# Patient Record
Sex: MALE | Race: WHITE | Employment: FULL TIME | URBAN - METROPOLITAN AREA
[De-identification: names, ages, dates, MRNs, and addresses within clinical notes are randomized per-mention and may not be internally consistent; named-entity substitution may affect disease eponyms.]

---

## 2017-05-29 ENCOUNTER — HOSPITAL ENCOUNTER (EMERGENCY)
Facility: HOSPITAL | Age: 57
Discharge: HOME | End: 2017-05-29
Attending: EMERGENCY MEDICINE
Payer: COMMERCIAL

## 2017-05-29 ENCOUNTER — APPOINTMENT (OUTPATIENT)
Dept: ULTRASOUND IMAGING | Facility: HOSPITAL | Age: 57
End: 2017-05-29
Attending: EMERGENCY MEDICINE
Payer: COMMERCIAL

## 2017-05-29 VITALS
RESPIRATION RATE: 19 BRPM | TEMPERATURE: 97 F | DIASTOLIC BLOOD PRESSURE: 91 MMHG | HEART RATE: 79 BPM | SYSTOLIC BLOOD PRESSURE: 129 MMHG | HEIGHT: 71 IN | BODY MASS INDEX: 22.4 KG/M2 | WEIGHT: 160 LBS | OXYGEN SATURATION: 97 %

## 2017-05-29 DIAGNOSIS — M71.21 POPLITEAL CYST, RIGHT: Primary | ICD-10-CM

## 2017-05-29 PROCEDURE — 99284 EMERGENCY DEPT VISIT MOD MDM: CPT

## 2017-05-29 PROCEDURE — 36415 COLL VENOUS BLD VENIPUNCTURE: CPT

## 2017-05-29 PROCEDURE — 93971 EXTREMITY STUDY: CPT | Performed by: EMERGENCY MEDICINE

## 2017-05-29 PROCEDURE — 85025 COMPLETE CBC W/AUTO DIFF WBC: CPT | Performed by: EMERGENCY MEDICINE

## 2017-05-29 NOTE — ED NOTES
Pt presents for right leg swelling and pain that started a few days ago. Pt reports recent travel for work.  Pt reports US done at OSH showing a cyst which is scheduled to be drained tomorrow AM. Pt presents d/t continued pain and welling. +bruising noted t

## 2017-05-29 NOTE — ED PROVIDER NOTES
Patient Seen in: [facility] Emergency Department    History   Patient presents with:  Swelling    Stated Complaint: R/O DVT      HPI    56 yo M with PMH prostate CA s/p prostatectomy one year ago in remission without interval complications, HL, HTN with BCR. Pulmonary/Chest: Effort normal.   Abdominal: Soft. Musculoskeletal: Right popliteal fullness without crepitance; ecchymoses to popliteal area/superior calf and distal foot. Neurological: Alert.  RLE proximally and distally with 5/5 strength an Dictated by (CST): Alfonso Carcamo MD on 5/29/2017 at 11:24     Approved by (CST): Alfonso Carcamo MD on 5/29/2017 at 11:28          5/29/2017  CONCLUSION:   No evidence of deep venous thrombosis in the right lower extremity.   Large complex avascular c

## 2017-05-29 NOTE — ED INITIAL ASSESSMENT (HPI)
Pt noticed a large lump behind his right leg, US was done and they saw a cyst, no DVT. Pt is c/o left leg pain. + bruising. Recent air travel. Hx prostate Ca.

## 2017-05-29 NOTE — ED NOTES
Discharge instructions given to pt. Pt verbalized understanding of home care, and to follow up as previously scheduled tomorrow for cyst drainage. Pt denied further questions or concerns. Pt ambulatory out of ED, discharged in stable condition.

## (undated) NOTE — ED AVS SNAPSHOT
[facility] Emergency Department    [address]     [place] [address]    Phone:  [phone]    Fax:  [phone]           Lyndsay Gandara   MRN: [id number]    Department:  [facility] Emergency Department   Date of Visit:  5/29/ a substitute for ongoing medical care. Often, one Emergency Department visit does not uncover every injury or illness.  If you have been referred to a primary care or a specialist physician for a follow-up visit, please tell this physician (or your personal [place] ([address]) [address]) [phone]   [place] [address] [employer]. ([address] [facility]) [address] [facility] [employer].  ([address] If you have questions, you can call [phone] to talk to our [facility] Staff. Remember, [employer] is NOT to be used for urgent needs. For medical emergencies, dial 911.

## (undated) NOTE — ED AVS SNAPSHOT
[facility] Emergency Department    [address]     [place] [address]    Phone:  [phone]    Fax:  [phone]           Marcia King   MRN: [id number]    Department:  [facility] Emergency Department   Date of Visit:  5/29/ and Class Registration line at [phone] or find a doctor online by visiting [email or web address].    IF THERE IS ANY CHANGE OR WORSENING OF YOUR CONDITION, CALL YOUR PRIMARY CARE PHYSICIAN AT ONCE OR RETURN IMMEDIATELY TO [address] [place] [address].     If